# Patient Record
Sex: FEMALE | URBAN - METROPOLITAN AREA
[De-identification: names, ages, dates, MRNs, and addresses within clinical notes are randomized per-mention and may not be internally consistent; named-entity substitution may affect disease eponyms.]

---

## 2022-11-05 ENCOUNTER — EMERGENCY (EMERGENCY)
Facility: HOSPITAL | Age: 32
LOS: 1 days | Discharge: HOME | End: 2022-11-05
Attending: EMERGENCY MEDICINE | Admitting: EMERGENCY MEDICINE

## 2022-11-05 VITALS
RESPIRATION RATE: 16 BRPM | TEMPERATURE: 98 F | SYSTOLIC BLOOD PRESSURE: 122 MMHG | HEART RATE: 110 BPM | DIASTOLIC BLOOD PRESSURE: 77 MMHG | OXYGEN SATURATION: 100 %

## 2022-11-05 PROCEDURE — 99284 EMERGENCY DEPT VISIT MOD MDM: CPT

## 2022-11-05 NOTE — ED PROVIDER NOTE - PATIENT PORTAL LINK FT
You can access the FollowMyHealth Patient Portal offered by [facility] by registering at the following website: [email or web address]. By joining [employer]’s FollowMyHealth portal, you will also be able to view your health information using other applications (apps) compatible with our system.

## 2022-11-05 NOTE — ED PROVIDER NOTE - CLINICAL SUMMARY MEDICAL DECISION MAKING FREE TEXT BOX
ambulating with steady gait, normal speech, no e/o trauma on exam, VSS, accompanied with family. dc home with return precautions.

## 2022-11-05 NOTE — ED PROVIDER NOTE - NS ED ROS FT
CONSTITUTIONAL:  No fever or chills, no bodyaches  HEENT:  No sore throat or headache, no nasal congestion  PULM:  No cough or shortness of breath  GI:  No diarrhea, no vomiting
No

## 2022-11-05 NOTE — ED PROVIDER NOTE - OBJECTIVE STATEMENT
30 y/o F BIBEMS for public intoxication, admits to etoh use, no other drugs. Arrives tearful but states she "feels fine." Aunt arrived to ED to stay with patient. No pain. No HA, visual sx, numbness/tingling/weakness. No n/v.

## 2022-11-08 DIAGNOSIS — R41.82 ALTERED MENTAL STATUS, UNSPECIFIED: ICD-10-CM

## 2022-11-08 DIAGNOSIS — F10.120 ALCOHOL ABUSE WITH INTOXICATION, UNCOMPLICATED: ICD-10-CM

## 2023-01-31 NOTE — ED ADULT TRIAGE NOTE - CAS EDN DISCHARGE ASSESSMENT
Patient returned call  Informed of results and schedule Mohs   Information sent via [employer] per patient request    Rodeny BOND RN  [facility]     
Reason for Call:  Other returning call    Detailed comments: Patient is returning call from nurse, about lab results from appointment on 1/26/23.      Phone Number Patient can be reached at: Home number on file [phone] (home)    Best Time: anytime     Can we leave a detailed message on this number? YES     Thank you,    Lucia Sanders  [facility]   [facility]      Call taken on 1/30/2023 at 4:44 PM by Lucia Sanders        
Alert and oriented to person, place and time